# Patient Record
Sex: FEMALE | Race: WHITE | NOT HISPANIC OR LATINO | Employment: FULL TIME | URBAN - METROPOLITAN AREA
[De-identification: names, ages, dates, MRNs, and addresses within clinical notes are randomized per-mention and may not be internally consistent; named-entity substitution may affect disease eponyms.]

---

## 2019-06-26 ENCOUNTER — OFFICE VISIT (OUTPATIENT)
Dept: URGENT CARE | Facility: PHYSICIAN GROUP | Age: 54
End: 2019-06-26
Payer: COMMERCIAL

## 2019-06-26 ENCOUNTER — HOSPITAL ENCOUNTER (OUTPATIENT)
Dept: RADIOLOGY | Facility: MEDICAL CENTER | Age: 54
End: 2019-06-26
Attending: NURSE PRACTITIONER
Payer: COMMERCIAL

## 2019-06-26 VITALS
TEMPERATURE: 98.7 F | OXYGEN SATURATION: 95 % | HEART RATE: 104 BPM | DIASTOLIC BLOOD PRESSURE: 90 MMHG | WEIGHT: 144 LBS | BODY MASS INDEX: 26.5 KG/M2 | SYSTOLIC BLOOD PRESSURE: 134 MMHG | RESPIRATION RATE: 16 BRPM | HEIGHT: 62 IN

## 2019-06-26 DIAGNOSIS — R10.9 LEFT FLANK PAIN: ICD-10-CM

## 2019-06-26 DIAGNOSIS — R93.89 ABNORMAL FINDING ON CT SCAN: ICD-10-CM

## 2019-06-26 DIAGNOSIS — R31.9 HEMATURIA, UNSPECIFIED TYPE: ICD-10-CM

## 2019-06-26 DIAGNOSIS — N20.0 KIDNEY STONE: ICD-10-CM

## 2019-06-26 LAB
APPEARANCE UR: CLEAR
BILIRUB UR STRIP-MCNC: NORMAL MG/DL
COLOR UR AUTO: NORMAL
GLUCOSE UR STRIP.AUTO-MCNC: NORMAL MG/DL
KETONES UR STRIP.AUTO-MCNC: NORMAL MG/DL
LEUKOCYTE ESTERASE UR QL STRIP.AUTO: NORMAL
NITRITE UR QL STRIP.AUTO: NORMAL
PH UR STRIP.AUTO: 5.5 [PH] (ref 5–8)
PROT UR QL STRIP: NORMAL MG/DL
RBC UR QL AUTO: NORMAL
SP GR UR STRIP.AUTO: 1
UROBILINOGEN UR STRIP-MCNC: 0.2 MG/DL

## 2019-06-26 PROCEDURE — 81002 URINALYSIS NONAUTO W/O SCOPE: CPT | Performed by: NURSE PRACTITIONER

## 2019-06-26 PROCEDURE — 99203 OFFICE O/P NEW LOW 30 MIN: CPT | Mod: 25 | Performed by: NURSE PRACTITIONER

## 2019-06-26 PROCEDURE — 74176 CT ABD & PELVIS W/O CONTRAST: CPT

## 2019-06-26 PROCEDURE — 96372 THER/PROPH/DIAG INJ SC/IM: CPT | Performed by: NURSE PRACTITIONER

## 2019-06-26 RX ORDER — IBUPROFEN 200 MG
200 TABLET ORAL EVERY 6 HOURS PRN
COMMUNITY

## 2019-06-26 RX ORDER — TAMSULOSIN HYDROCHLORIDE 0.4 MG/1
0.4 CAPSULE ORAL
Qty: 30 CAP | Refills: 0 | Status: SHIPPED | OUTPATIENT
Start: 2019-06-26 | End: 2019-12-05

## 2019-06-26 RX ORDER — KETOROLAC TROMETHAMINE 30 MG/ML
15 INJECTION, SOLUTION INTRAMUSCULAR; INTRAVENOUS ONCE
Status: COMPLETED | OUTPATIENT
Start: 2019-06-26 | End: 2019-06-26

## 2019-06-26 RX ADMIN — KETOROLAC TROMETHAMINE 15 MG: 30 INJECTION, SOLUTION INTRAMUSCULAR; INTRAVENOUS at 17:08

## 2019-06-26 ASSESSMENT — ENCOUNTER SYMPTOMS
CHILLS: 0
FATIGUE: 0
NAUSEA: 0
SHORTNESS OF BREATH: 0
VOMITING: 0
MYALGIAS: 0
PAIN: 1
FLANK PAIN: 1
SORE THROAT: 0
EYE PAIN: 0
ABDOMINAL PAIN: 1
FEVER: 0
DIZZINESS: 0

## 2019-06-26 NOTE — PROGRESS NOTES
Subjective:     Sarah Mason is a 53 y.o. female who presents for Flank Pain (Left side flank pain and abd pain.  Blood in urine this morning.)       Pain   This is a new problem. The current episode started yesterday (severe left flank pain; pain similar to previous kidney stone.  Patient with history of kidney stones able to pass unassisted.). The problem occurs intermittently. The problem has been waxing and waning. Associated symptoms include abdominal pain and urinary symptoms (Blood in the urine). Pertinent negatives include no chest pain, chills, fatigue, fever, myalgias, nausea, rash, sore throat or vomiting. Nothing aggravates the symptoms. She has tried acetaminophen for the symptoms. The treatment provided no relief.   History reviewed. No pertinent past medical history.History reviewed. No pertinent surgical history.  Social History     Social History   • Marital status:      Spouse name: N/A   • Number of children: N/A   • Years of education: N/A     Occupational History   • Not on file.     Social History Main Topics   • Smoking status: Never Smoker   • Smokeless tobacco: Never Used   • Alcohol use Not on file   • Drug use: Unknown   • Sexual activity: Not on file     Other Topics Concern   • Not on file     Social History Narrative   • No narrative on file    History reviewed. No pertinent family history. Review of Systems   Constitutional: Negative for chills, fatigue and fever.   HENT: Negative for sore throat.    Eyes: Negative for pain.   Respiratory: Negative for shortness of breath.    Cardiovascular: Negative for chest pain.   Gastrointestinal: Positive for abdominal pain. Negative for nausea and vomiting.   Genitourinary: Positive for flank pain and hematuria. Negative for frequency and urgency.   Musculoskeletal: Negative for myalgias.   Skin: Negative for rash.   Neurological: Negative for dizziness.     Allergies   Allergen Reactions   • Darvon [Propoxyphene]      Disoriented  "and emesis      Objective:   /90   Pulse (!) 104   Temp 37.1 °C (98.7 °F) (Temporal)   Resp 16   Ht 1.575 m (5' 2\")   Wt 65.3 kg (144 lb)   LMP 11/01/2017 (Approximate)   SpO2 95%   BMI 26.34 kg/m²   Physical Exam   Constitutional: She is oriented to person, place, and time. She appears well-developed and well-nourished. No distress.   HENT:   Head: Normocephalic and atraumatic.   Eyes: Pupils are equal, round, and reactive to light. Conjunctivae and EOM are normal.   Cardiovascular: Normal rate and regular rhythm.    No murmur heard.  Pulmonary/Chest: Effort normal and breath sounds normal. No respiratory distress.   Abdominal: Soft. Bowel sounds are normal. She exhibits no distension. There is no tenderness. There is CVA tenderness (left side). There is no rigidity, no guarding, no tenderness at McBurney's point and negative Sheppard's sign.   Neurological: She is alert and oriented to person, place, and time. She has normal reflexes. No sensory deficit.   Skin: Skin is warm and dry.   Psychiatric: She has a normal mood and affect.         Assessment/Plan:   Assessment    1. Left flank pain  CT-RENAL COLIC EVALUATION(A/P W/O)    ketorolac (TORADOL) injection 15 mg    POCT Urinalysis   2. Hematuria, unspecified type  CT-RENAL COLIC EVALUATION(A/P W/O)    ketorolac (TORADOL) injection 15 mg    POCT Urinalysis   3. Kidney stone  REFERRAL TO UROLOGY    tamsulosin (FLOMAX) 0.4 MG capsule   4. Abnormal finding on CT scan  REFERRAL TO FOLLOW-UP WITH PRIMARY CARE     Lab Results   Component Value Date/Time    POCCOLOR Pink 06/26/2019 11:10 AM    POCAPPEAR Clear 06/26/2019 11:10 AM    POCLEUKEST Neg 06/26/2019 11:10 AM    POCNITRITE Neg 06/26/2019 11:10 AM    POCUROBILIGE 0.2 06/26/2019 11:10 AM    POCPROTEIN Neg 06/26/2019 11:10 AM    POCURPH 5.5 06/26/2019 11:10 AM    POCBLOOD Large 06/26/2019 11:10 AM    POCSPGRV 1.005 06/26/2019 11:10 AM    POCKETONES Neg 06/26/2019 11:10 AM    POCBILIRUBIN Neg 06/26/2019 " 11:10 AM    POCGLUCUA Neg 06/26/2019 11:10 AM          Ct result   1.  4 x 5 x 9 mm stone in the mid to distal LEFT ureter with moderate obstructive changes.  2.  Cholelithiasis.  3.  Probable fibroid uterus.  4.  Colonic diverticulosis without evidence for diverticulitis.  5.  Pleural-based nodule in the basal RIGHT lower lobe of lung measuring 9 x 12 mm.    Low and High Risk: Consider CT at 3 months, PET/CT, or tissue sampling.    Low Risk - Minimal or absent history of smoking and of other known risk factors.    High Risk - History of smoking or of other known risk factors.    Note: These recommendations do not apply to lung cancer screening, patients with immunosuppression, or patients with known primary cancer.    Fleischner Society 2017 Guidelines for Management of Incidentally Detected Pulmonary Nodules in Adults      Consulted Dr. Garsia in regards to CT findings.  Was advised to help facilitate passing of stone with Flomax and pain control.  Referral placed to urology for follow-up.    Telephone encounter [phone]:   Contact patient in regards to CT findings.  Advised prescription has been sent to to the pharmacy to help facilitate passing of stone.  Patient verbalizing improvement of pain with Toradol injection.  Incidental finding noted of a lung nodule of the right lower lung.  Patient denies any history of tobacco use.  Patient denies any recent weight loss or weight gain, fatigue, cough, bloody sputum patient low risk.  Will have patient follow-up with primary care provider for repeat CT in 3 months.  Encourage patient to continue taking Tylenol ibuprofen for pain as she did just declined prescription for pain medication.  Differential diagnosis, natural history, supportive care, and indications for immediate follow-up discussed.

## 2019-08-19 ENCOUNTER — HOSPITAL ENCOUNTER (OUTPATIENT)
Dept: RADIOLOGY | Facility: MEDICAL CENTER | Age: 54
End: 2019-08-19
Attending: UROLOGY
Payer: COMMERCIAL

## 2019-08-19 DIAGNOSIS — N20.0 CALCULUS OF KIDNEY: ICD-10-CM

## 2019-08-19 PROCEDURE — 74176 CT ABD & PELVIS W/O CONTRAST: CPT

## 2019-08-19 PROCEDURE — 74018 RADEX ABDOMEN 1 VIEW: CPT

## 2019-09-03 ENCOUNTER — HOSPITAL ENCOUNTER (OUTPATIENT)
Dept: RADIOLOGY | Facility: MEDICAL CENTER | Age: 54
End: 2019-09-03
Attending: UROLOGY
Payer: COMMERCIAL

## 2019-09-03 DIAGNOSIS — N20.0 CALCULUS OF KIDNEY: ICD-10-CM

## 2019-09-03 PROCEDURE — 74018 RADEX ABDOMEN 1 VIEW: CPT

## 2019-12-05 ENCOUNTER — OFFICE VISIT (OUTPATIENT)
Dept: MEDICAL GROUP | Facility: PHYSICIAN GROUP | Age: 54
End: 2019-12-05
Payer: COMMERCIAL

## 2019-12-05 VITALS
WEIGHT: 132 LBS | HEART RATE: 104 BPM | SYSTOLIC BLOOD PRESSURE: 120 MMHG | OXYGEN SATURATION: 98 % | BODY MASS INDEX: 24.29 KG/M2 | TEMPERATURE: 98.5 F | HEIGHT: 62 IN | DIASTOLIC BLOOD PRESSURE: 80 MMHG

## 2019-12-05 DIAGNOSIS — R91.1 SOLITARY PULMONARY NODULE: ICD-10-CM

## 2019-12-05 DIAGNOSIS — Z12.11 SCREENING FOR COLORECTAL CANCER: ICD-10-CM

## 2019-12-05 DIAGNOSIS — K80.20 CALCULUS OF GALLBLADDER WITHOUT CHOLECYSTITIS WITHOUT OBSTRUCTION: ICD-10-CM

## 2019-12-05 DIAGNOSIS — Z12.39 ENCOUNTER FOR SCREENING FOR MALIGNANT NEOPLASM OF BREAST: ICD-10-CM

## 2019-12-05 DIAGNOSIS — Z11.59 NEED FOR HEPATITIS C SCREENING TEST: ICD-10-CM

## 2019-12-05 DIAGNOSIS — Z23 NEED FOR VACCINATION: ICD-10-CM

## 2019-12-05 DIAGNOSIS — Z00.00 BLOOD TESTS FOR ROUTINE GENERAL PHYSICAL EXAMINATION: ICD-10-CM

## 2019-12-05 DIAGNOSIS — Z12.83 SKIN EXAM, SCREENING FOR CANCER: ICD-10-CM

## 2019-12-05 DIAGNOSIS — Z12.12 SCREENING FOR COLORECTAL CANCER: ICD-10-CM

## 2019-12-05 DIAGNOSIS — Z87.442 HISTORY OF NEPHROLITHIASIS: ICD-10-CM

## 2019-12-05 PROCEDURE — 90686 IIV4 VACC NO PRSV 0.5 ML IM: CPT | Performed by: PHYSICIAN ASSISTANT

## 2019-12-05 PROCEDURE — 90472 IMMUNIZATION ADMIN EACH ADD: CPT | Performed by: PHYSICIAN ASSISTANT

## 2019-12-05 PROCEDURE — 99214 OFFICE O/P EST MOD 30 MIN: CPT | Mod: 25 | Performed by: PHYSICIAN ASSISTANT

## 2019-12-05 PROCEDURE — 90471 IMMUNIZATION ADMIN: CPT | Performed by: PHYSICIAN ASSISTANT

## 2019-12-05 PROCEDURE — 90715 TDAP VACCINE 7 YRS/> IM: CPT | Performed by: PHYSICIAN ASSISTANT

## 2019-12-05 SDOH — HEALTH STABILITY: MENTAL HEALTH: HOW OFTEN DO YOU HAVE A DRINK CONTAINING ALCOHOL?: 2-4 TIMES A MONTH

## 2019-12-05 SDOH — HEALTH STABILITY: MENTAL HEALTH: HOW MANY STANDARD DRINKS CONTAINING ALCOHOL DO YOU HAVE ON A TYPICAL DAY?: 1 OR 2

## 2019-12-05 ASSESSMENT — PATIENT HEALTH QUESTIONNAIRE - PHQ9: CLINICAL INTERPRETATION OF PHQ2 SCORE: 0

## 2019-12-06 NOTE — PROGRESS NOTES
Subjective:   Minnie Mason is a 54 y.o. female here today for follow-up on nephrolithiasis, lung nodule, skin lesion on face. Is a new patient to me and is also establishing care today.    Previous PCP: None    HPI:    Antoinette presents to the office today to establish care with me. She was seen in  back in June with left flank pain and found to have left ureteral stone. She followed up with urology and underwent ureteroscopy with laser removal in August. States stone was sent for testing and was found to be calcium oxalate in composition. She does recall having one prior episode of kidney stone last December-January with spontaneous passage. She has been trying to follow diet that was recommended by her urologist.    She does have some questions regarding incidental findings on her CT scans including gallstones and lung nodule. She has not had any GI symptoms to include post-prandial RUQ pain. Denies smoking history or family history of lung cancer.    She would like me to look at a lesion on her right upper cheek. She states that it's been there for many years and has not noticed any change, but has always wondered if it's anything to worry about. She does have family history of skin cancer in her father. Does not currently follow with a dermatologist.       Current medicines (including changes today)  Current Outpatient Medications   Medication Sig Dispense Refill   • ibuprofen (MOTRIN) 200 MG Tab Take 200 mg by mouth every 6 hours as needed.     • tamsulosin (FLOMAX) 0.4 MG capsule Take 1 Cap by mouth ONE-HALF HOUR AFTER BREAKFAST. 30 Cap 0     No current facility-administered medications for this visit.      She  has a past medical history of Closed fracture of left radius and ulna and Nephrolithiasis.    ROS  Pulmonary ROS: No shortness of breath  Cardiovascular ROS: No chest pain       Objective:     /80 (BP Location: Left arm, Patient Position: Sitting, BP Cuff Size: Adult)   Pulse (!) 104   Temp  "36.9 °C (98.5 °F)   Ht 1.575 m (5' 2\")   Wt 59.9 kg (132 lb)   SpO2 98%  Body mass index is 24.14 kg/m².     Physical Exam:  Constitutional: Alert, well-appearing, very pleasant, no distress.  Skin: Warm, dry, good turgor, no rashes in visible areas. There is a ~3 x 3 mm flesh-colored papular lesion on the right upper cheek.   Eye: Pupils are equal and round, conjunctiva clear, lids normal.  ENMT: Lips without lesions, moist mucus membranes.  Neck: No masses. No submandibular or cervical lymphadenopathy. No palpable thyromegaly.  Respiratory: Unlabored respiratory effort, lungs clear to auscultation, no wheezes, no rhonchi.  Cardiovascular: Normal S1, S2, no murmur.      Assessment and Plan:   The following treatment plan was discussed    1. History of nephrolithiasis  Previous kidney stones x 2. Now following low oxalate diet. Will follow up with urology only as-needed.    2. Solitary pulmonary nodule  New problem to me, stable. Reviewed both CT scans that were done with patient. Initial exam in 6/2019 showed 12 mm right nodule. CT was repeated about 1.5 months later which showed nodule to be stable. She is low risk in that she has no history of smoking, no family history of lung cancer. Nevertheless, given that size it should be followed. Recommended repeat dedicated chest CT at 6-month jarett (which would be this coming February) to check stability. Consider referral to nodule clinic pending results.  - CT-CHEST (THORAX) W/O; Future    3. Calculus of gallbladder without cholecystitis without obstruction  New problem to me, stable. There was incidental finding of multiple gallstones on previous CTs earlier this year. Given absence of any symptoms, recommend simply monitoring. If she starts developing colicky RUQ pain, should follow-up for surgery referral.     4. Skin exam, screening for cancer  Lesion appears benign. Regardless, recommend establishing with dermatology for routine annual skin screening exams. " Referral ordered.  - REFERRAL TO DERMATOLOGY    5. Encounter for screening for malignant neoplasm of breast  Last mammogram about 5 years ago per patient. Recommend updating this. I have ordered.  - MA-SCREEN MAMMO W/CAD-BILAT; Future    6. Screening for colorectal cancer  Has never had colonoscopy. Is willing to have done so referral ordered.  - REFERRAL TO GI FOR COLONOSCOPY    7. Need for hepatitis C screening test  - HEP C VIRUS ANTIBODY; Future    8. Blood tests for routine general physical examination  Patient advised to have fasting screening lab work done at earliest convenience.  - VITAMIN D,25 HYDROXY; Future  - Comp Metabolic Panel; Future  - Lipid Profile; Future  - CBC WITH DIFFERENTIAL; Future    9. Need for vaccination  - Influenza Vaccine Quad Injection (PF)  - TDAP VACCINE =>6YO IM      Followup: Return in about 1 year (around 12/5/2020) for Annual; Short.    Shantel Steven P.A.-C.

## 2021-03-15 DIAGNOSIS — Z23 NEED FOR VACCINATION: ICD-10-CM
